# Patient Record
Sex: MALE | Race: WHITE | NOT HISPANIC OR LATINO | Employment: FULL TIME | URBAN - METROPOLITAN AREA
[De-identification: names, ages, dates, MRNs, and addresses within clinical notes are randomized per-mention and may not be internally consistent; named-entity substitution may affect disease eponyms.]

---

## 2018-12-07 ENCOUNTER — HOSPITAL ENCOUNTER (EMERGENCY)
Facility: HOSPITAL | Age: 28
Discharge: HOME | End: 2018-12-07
Attending: EMERGENCY MEDICINE | Admitting: EMERGENCY MEDICINE

## 2018-12-07 VITALS
HEART RATE: 78 BPM | RESPIRATION RATE: 16 BRPM | TEMPERATURE: 98.2 F | WEIGHT: 160 LBS | DIASTOLIC BLOOD PRESSURE: 92 MMHG | BODY MASS INDEX: 23.7 KG/M2 | OXYGEN SATURATION: 97 % | SYSTOLIC BLOOD PRESSURE: 151 MMHG | HEIGHT: 69 IN

## 2018-12-07 DIAGNOSIS — S01.81XA FOREHEAD LACERATION, INITIAL ENCOUNTER: Primary | ICD-10-CM

## 2018-12-07 DIAGNOSIS — R03.0 ELEVATED BLOOD PRESSURE READING: ICD-10-CM
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
## 2018-12-07 DIAGNOSIS — Z23 NEED FOR TDAP VACCINATION: ICD-10-CM

## 2018-12-07 PROCEDURE — 90471 IMMUNIZATION ADMIN: CPT | Performed by: EMERGENCY MEDICINE

## 2018-12-07 PROCEDURE — 25010000002 TDAP 5-2.5-18.5 LF-MCG/0.5 SUSPENSION: Performed by: EMERGENCY MEDICINE

## 2018-12-07 PROCEDURE — 90715 TDAP VACCINE 7 YRS/> IM: CPT | Performed by: EMERGENCY MEDICINE

## 2018-12-07 PROCEDURE — 99283 EMERGENCY DEPT VISIT LOW MDM: CPT

## 2018-12-07 RX ORDER — LIDOCAINE HYDROCHLORIDE AND EPINEPHRINE 10; 10 MG/ML; UG/ML
10 INJECTION, SOLUTION INFILTRATION; PERINEURAL ONCE
Status: COMPLETED | OUTPATIENT
Start: 2018-12-07 | End: 2018-12-07

## 2018-12-07 RX ADMIN — TETANUS TOXOID, REDUCED DIPHTHERIA TOXOID AND ACELLULAR PERTUSSIS VACCINE, ADSORBED 0.5 ML: 5; 2.5; 8; 8; 2.5 SUSPENSION INTRAMUSCULAR at 16:55

## 2018-12-07 RX ADMIN — LIDOCAINE HYDROCHLORIDE,EPINEPHRINE BITARTRATE 10 ML: 10; .01 INJECTION, SOLUTION INFILTRATION; PERINEURAL at 16:55

## 2018-12-07 NOTE — ED PROVIDER NOTES
"Subjective   Sarwat Pearson is a 28 y.o.male who presents to the ED with c/o laceration with onset one hour ago. He states that he was splitting firewood with a splittng maul when the handle broke striking him in the right temple. He sustained two 1.5 centimeter lacerations to his right temple region. He denies any LOC but states that it \"rocked\" him for a second. He c/o a headache that is a 6/10 in severity. He c/o pressure and swelling around the wound. He denies any numbness, tingling, weakness, visual changes, hearing loss, tinnitus, nausea, vomiting or any additional acute complaints at this time. He reports a h/o arm fracture but denies any additional past medical history or daily medication use.         History provided by:  Patient  Head Laceration   Location:  Right temple  Quality:  Laceration  Severity:  Moderate  Onset quality:  Sudden  Duration:  1 hour  Timing:  Constant  Progression:  Worsening  Chronicity:  New  Context:  Patient was splitting firewood and handle broke hitting him in right temple no LOC  Relieved by:  None  Worsened by:  None  Ineffective treatments:  Pressure   Associated symptoms: headaches    Associated symptoms: no ear pain, no loss of consciousness, no nausea and no vomiting    Risk factors:  None.       Review of Systems   HENT: Negative for ear pain, hearing loss and tinnitus.    Eyes: Negative for visual disturbance.   Gastrointestinal: Negative for nausea and vomiting.   Skin: Positive for wound.   Neurological: Positive for headaches. Negative for dizziness, loss of consciousness, syncope, weakness and numbness.   All other systems reviewed and are negative.      History reviewed. No pertinent past medical history.    No Known Allergies    History reviewed. No pertinent surgical history.    History reviewed. No pertinent family history.    Social History     Socioeconomic History   • Marital status: Single     Spouse name: Not on file   • Number of children: Not on file   • " Years of education: Not on file   • Highest education level: Not on file   Tobacco Use   • Smoking status: Current Some Day Smoker   Substance and Sexual Activity   • Alcohol use: Yes     Comment: OCCASIONALLY         Objective   Physical Exam   Constitutional: He is oriented to person, place, and time. He appears well-developed and well-nourished. No distress.   HENT:   Head: Normocephalic. Head is with laceration.   Right Ear: External ear normal.   Left Ear: External ear normal.   Nose: Nose normal.   Two 1.5 centimeter lacerations over right temporal region.    Eyes: Conjunctivae are normal. No scleral icterus.   Neck: Normal range of motion. Neck supple.   Cardiovascular: Normal rate, regular rhythm, normal heart sounds and intact distal pulses. Exam reveals no friction rub.   No murmur heard.  Pulmonary/Chest: Effort normal and breath sounds normal. No respiratory distress. He has no wheezes. He has no rales.   Abdominal: Soft. Bowel sounds are normal. He exhibits no distension. There is no tenderness.   Musculoskeletal: Normal range of motion. He exhibits no edema or tenderness.   Neurological: He is alert and oriented to person, place, and time.   Skin: Skin is warm and dry. He is not diaphoretic.   Psychiatric: He has a normal mood and affect. His behavior is normal.   Nursing note and vitals reviewed.      Laceration Repair  Date/Time: 12/7/2018 4:59 PM  Performed by: Bassem Hernandez MD  Authorized by: Bassem Hernandez MD     Consent:     Consent obtained:  Verbal    Consent given by:  Patient    Risks discussed:  Infection, need for additional repair, nerve damage, poor wound healing, poor cosmetic result, retained foreign body and pain    Alternatives discussed:  No treatment  Anesthesia (see MAR for exact dosages):     Anesthesia method:  Local infiltration    Local anesthetic:  Lidocaine 1% WITH epi  Laceration details:     Location:  Face    Face location:  Forehead    Length (cm):  3 (two  1.5 centimeter lacerations)  Repair type:     Repair type:  Simple  Pre-procedure details:     Preparation:  Patient was prepped and draped in usual sterile fashion  Exploration:     Hemostasis achieved with:  Direct pressure and epinephrine    Wound exploration: wound explored through full range of motion and entire depth of wound probed and visualized      Contaminated: no    Treatment:     Area cleansed with:  Betadine    Amount of cleaning:  Extensive    Irrigation solution:  Sterile saline    Irrigation method:  Syringe    Visualized foreign bodies/material removed: no    Skin repair:     Repair method:  Sutures    Suture size:  5-0    Suture material:  Prolene    Suture technique:  Simple interrupted    Number of sutures:  6  Approximation:     Approximation:  Close    Vermilion border: well-aligned    Post-procedure details:     Dressing:  Open (no dressing)    Patient tolerance of procedure:  Tolerated well, no immediate complications  Comments:      3 sutures in each of the two 1.5 centimeter laceration closed with 5-0 prolene.                ED Course  ED Course as of Dec 07 1945   Fri Dec 07, 2018   1659 Primary repair with both lacerations closed with 3 interrupted sutures.  See procedure note for further details.  Potential risk of infection with wound care, follow-up, and return instructions discussed with the patient prior to discharge.  Risk of potential retained foreign body discussed as well.  Patient denies any significant headache at this time.  No depressed skull fractures.  We will discharge home with return for concerns.  The patient voices understanding and is agreeable with this plan.  [RS]      ED Course User Index  [RS] Bassem Hernandez MD     No results found for this or any previous visit (from the past 24 hour(s)).  Note: In addition to lab results from this visit, the labs listed above may include labs taken at another facility or during a different encounter within the last 24  "hours. Please correlate lab times with ED admission and discharge times for further clarification of the services performed during this visit.    No orders to display     Vitals:    12/07/18 1556   BP: 151/92   BP Location: Left arm   Patient Position: Sitting   Pulse: 78   Resp: 16   Temp: 98.2 °F (36.8 °C)   TempSrc: Oral   SpO2: 97%   Weight: 72.6 kg (160 lb)   Height: 175.3 cm (69\")     Medications   Tdap (BOOSTRIX) injection 0.5 mL (0.5 mL Intramuscular Given 12/7/18 1655)   lidocaine-EPINEPHrine (XYLOCAINE W/EPI) 1 %-1:[id number] injection 10 mL (10 mL Injection Given 12/7/18 1655)     ECG/EMG Results (last 24 hours)     ** No results found for the last 24 hours. **                        MDM  Number of Diagnoses or Management Options  Elevated blood pressure reading:   Forehead laceration, initial encounter:   Need for Tdap vaccination:   Work related injury:       Final diagnoses:   Forehead laceration, initial encounter   Need for Tdap vaccination   Work related injury   Elevated blood pressure reading       Documentation assistance provided by kristy Bernardo.  Information recorded by the kristy was done at my direction and has been verified and validated by me.     Ashkan Bernardo  12/07/18 1616       Ashkan Bernardo  12/07/18 1704       Bassem Hernandez MD  12/07/18 1945    "

## 2018-12-17 ENCOUNTER — HOSPITAL ENCOUNTER (EMERGENCY)
Facility: HOSPITAL | Age: 28
Discharge: HOME | End: 2018-12-17

## 2018-12-17 VITALS
BODY MASS INDEX: 23.7 KG/M2 | WEIGHT: 160 LBS | DIASTOLIC BLOOD PRESSURE: 97 MMHG | HEART RATE: 86 BPM | SYSTOLIC BLOOD PRESSURE: 133 MMHG | TEMPERATURE: 98.1 F | OXYGEN SATURATION: 97 % | RESPIRATION RATE: 16 BRPM | HEIGHT: 69 IN

## 2018-12-17 PROCEDURE — 99201: CPT
